# Patient Record
Sex: FEMALE | URBAN - METROPOLITAN AREA
[De-identification: names, ages, dates, MRNs, and addresses within clinical notes are randomized per-mention and may not be internally consistent; named-entity substitution may affect disease eponyms.]

---

## 2022-11-01 ENCOUNTER — OFFICE VISIT (OUTPATIENT)
Dept: URBAN - METROPOLITAN AREA CLINIC 30 | Facility: CLINIC | Age: 87
End: 2022-11-01
Payer: MEDICARE

## 2022-11-01 DIAGNOSIS — H25.813 COMBINED FORMS OF AGE-RELATED CATARACT, BILATERAL: ICD-10-CM

## 2022-11-01 DIAGNOSIS — H43.813 VITREOUS DEGENERATION, BILATERAL: Primary | ICD-10-CM

## 2022-11-01 DIAGNOSIS — H04.123 DRY EYE SYNDROME OF BILATERAL LACRIMAL GLANDS: ICD-10-CM

## 2022-11-01 PROCEDURE — 92134 CPTRZ OPH DX IMG PST SGM RTA: CPT

## 2022-11-01 PROCEDURE — 92004 COMPRE OPH EXAM NEW PT 1/>: CPT

## 2022-11-01 ASSESSMENT — VISUAL ACUITY
OD: 20/25
OS: 20/30

## 2022-11-01 ASSESSMENT — KERATOMETRY
OD: 44.91
OS: 45.11

## 2022-11-01 ASSESSMENT — INTRAOCULAR PRESSURE
OS: 15
OD: 16

## 2022-11-01 NOTE — IMPRESSION/PLAN
Impression: Dry eye syndrome of bilateral lacrimal glands: H04.123. Plan: Dry eyes account for the patient's complaints. There is no evidence of permanent changes to the cornea. Explained condition does not have a cure and will need artificial tears for maintenance. Patient instructed to use artificial tears 4-6x/daily. Explained it may take time for eyes to acclimate completely to OTC gtt regimen. Patient to call back if symptoms do not improve in 4-6 weeks or symptoms worsen to discuss further intervention.

## 2022-11-01 NOTE — IMPRESSION/PLAN
Impression: Combined forms of age-related cataract, bilateral: H25.813. Plan: Significant cataract however pt still will great functional vision. Pt defers cat sx at this time. MRx released. Monitor 6 mo.